# Patient Record
Sex: MALE | Race: WHITE | Employment: FULL TIME | URBAN - METROPOLITAN AREA
[De-identification: names, ages, dates, MRNs, and addresses within clinical notes are randomized per-mention and may not be internally consistent; named-entity substitution may affect disease eponyms.]

---

## 2021-02-15 ENCOUNTER — HOSPITAL ENCOUNTER (EMERGENCY)
Age: 26
Discharge: HOME | End: 2021-02-15
Attending: EMERGENCY MEDICINE
Payer: MEDICAID

## 2021-02-15 ENCOUNTER — APPOINTMENT (OUTPATIENT)
Dept: GENERAL RADIOLOGY | Age: 26
End: 2021-02-15
Attending: EMERGENCY MEDICINE
Payer: MEDICAID

## 2021-02-15 VITALS
HEART RATE: 75 BPM | BODY MASS INDEX: 25.05 KG/M2 | DIASTOLIC BLOOD PRESSURE: 75 MMHG | SYSTOLIC BLOOD PRESSURE: 122 MMHG | TEMPERATURE: 98.4 F | WEIGHT: 175 LBS | HEIGHT: 70 IN | RESPIRATION RATE: 18 BRPM | OXYGEN SATURATION: 97 %

## 2021-02-15 DIAGNOSIS — S69.92XA LEFT WRIST INJURY, INITIAL ENCOUNTER: Primary | ICD-10-CM

## 2021-02-15 PROCEDURE — 74011250637 HC RX REV CODE- 250/637: Performed by: NURSE PRACTITIONER

## 2021-02-15 PROCEDURE — 73110 X-RAY EXAM OF WRIST: CPT

## 2021-02-15 PROCEDURE — 99284 EMERGENCY DEPT VISIT MOD MDM: CPT

## 2021-02-15 RX ORDER — ACETAMINOPHEN AND CODEINE PHOSPHATE 300; 30 MG/1; MG/1
1 TABLET ORAL
Qty: 30 TAB | Refills: 0 | Status: SHIPPED | OUTPATIENT
Start: 2021-02-15 | End: 2021-02-20

## 2021-02-15 RX ORDER — HYDROCODONE BITARTRATE AND ACETAMINOPHEN 5; 325 MG/1; MG/1
1 TABLET ORAL ONCE
Status: COMPLETED | OUTPATIENT
Start: 2021-02-15 | End: 2021-02-15

## 2021-02-15 RX ADMIN — HYDROCODONE BITARTRATE AND ACETAMINOPHEN 1 TABLET: 5; 325 TABLET ORAL at 20:25

## 2021-02-15 NOTE — Clinical Note
[address] [address] EMERGENCY DEPT 
[address] [facility] [address] [phone] Work/School Note Date: 2/15/2021 To Whom It May concern: 
 
Troy Camarillo was seen and treated today in the emergency room by the following provider(s): 
Attending Provider: Charity Carmona MD 
Nurse Practitioner: Ethan Huffman NP. Troy Camarillo is excused from work/school on 02/15/21 and 02/16/21. He is medically clear to return to work/school on 2/17/2021. Sincerely, Mone Taylor NP

## 2021-02-16 NOTE — DISCHARGE INSTRUCTIONS
Home with family   use splint until you see ortho. Contact ortho tomorrow for appt with Dr Baldomero cruz.   Pain meds if needed
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
## 2021-02-16 NOTE — ED NOTES
I have reviewed discharge instructions with the patient. The patient verbalized understanding. Patient left ED via Discharge Method: ambulatory to Home with mother. Opportunity for questions and clarification provided. Patient given 1 scripts. To continue your aftercare when you leave the hospital, you may receive an automated call from our care team to check in on how you are doing. This is a free service and part of our promise to provide the best care and service to meet your aftercare needs.  If you have questions, or wish to unsubscribe from this service please call [phone]. Thank you for Choosing our [facility] Emergency Department.

## 2021-09-12 ENCOUNTER — HOSPITAL ENCOUNTER (EMERGENCY)
Age: 26
Discharge: HOME | End: 2021-09-12
Attending: EMERGENCY MEDICINE
Payer: MEDICAID

## 2021-09-12 VITALS
OXYGEN SATURATION: 97 % | WEIGHT: 180 LBS | HEART RATE: 72 BPM | DIASTOLIC BLOOD PRESSURE: 72 MMHG | SYSTOLIC BLOOD PRESSURE: 113 MMHG | BODY MASS INDEX: 25.77 KG/M2 | RESPIRATION RATE: 16 BRPM | TEMPERATURE: 98.1 F | HEIGHT: 70 IN

## 2021-09-12 DIAGNOSIS — Z20.822 CLOSE EXPOSURE TO COVID-19 VIRUS: Primary | ICD-10-CM

## 2021-09-12 LAB — SARS-COV-2, COV2: NORMAL

## 2021-09-12 PROCEDURE — U0004 COV-19 TEST NON-CDC HGH THRU: HCPCS

## 2021-09-12 PROCEDURE — 99282 EMERGENCY DEPT VISIT SF MDM: CPT

## 2021-09-12 NOTE — ED NOTES
I have reviewed discharge instructions with the patient. The patient verbalized understanding. Patient left ED via Discharge Method: ambulatory to Home. Opportunity for questions and clarification provided. Patient given 0 scripts. To continue your aftercare when you leave the hospital, you may receive an automated call from our care team to check in on how you are doing. This is a free service and part of our promise to provide the best care and service to meet your aftercare needs.  If you have questions, or wish to unsubscribe from this service please call [phone]. Thank you for Choosing our [facility] Emergency Department.

## 2021-09-12 NOTE — LETTER
[address] [address] EMERGENCY DEPT  [address] [address] [phone]    Work/School Note    Date: 9/12/2021    To Whom It May concern:    Troy Camarillo was seen and treated today in the emergency room by the following provider(s):  Attending Provider: Lauri Lewis MD  Physician Assistant: CITLALLI Levin. Troy Camarillo is excused from work/school on 9/12/2021 through 9/15/2021. He is medically clear to return to work/school on 9/16/2021.         Sincerely,          CITLALLI Villa

## 2021-09-13 LAB — SARS COV-2, XPGCVT: NEGATIVE

## 2021-10-03 ENCOUNTER — APPOINTMENT (OUTPATIENT)
Dept: GENERAL RADIOLOGY | Age: 26
End: 2021-10-03
Attending: EMERGENCY MEDICINE
Payer: MEDICAID

## 2021-10-03 ENCOUNTER — HOSPITAL ENCOUNTER (EMERGENCY)
Age: 26
Discharge: HOME | End: 2021-10-03
Attending: EMERGENCY MEDICINE
Payer: MEDICAID

## 2021-10-03 VITALS
DIASTOLIC BLOOD PRESSURE: 84 MMHG | WEIGHT: 175 LBS | BODY MASS INDEX: 25.05 KG/M2 | RESPIRATION RATE: 18 BRPM | OXYGEN SATURATION: 97 % | TEMPERATURE: 98.7 F | SYSTOLIC BLOOD PRESSURE: 150 MMHG | HEART RATE: 90 BPM | HEIGHT: 70 IN

## 2021-10-03 DIAGNOSIS — S39.012A STRAIN OF LUMBAR REGION, INITIAL ENCOUNTER: Primary | ICD-10-CM

## 2021-10-03 PROCEDURE — 81003 URINALYSIS AUTO W/O SCOPE: CPT

## 2021-10-03 PROCEDURE — 74011250636 HC RX REV CODE- 250/636: Performed by: EMERGENCY MEDICINE

## 2021-10-03 PROCEDURE — 99283 EMERGENCY DEPT VISIT LOW MDM: CPT

## 2021-10-03 PROCEDURE — 72100 X-RAY EXAM L-S SPINE 2/3 VWS: CPT

## 2021-10-03 PROCEDURE — 96372 THER/PROPH/DIAG INJ SC/IM: CPT

## 2021-10-03 PROCEDURE — 74011250637 HC RX REV CODE- 250/637: Performed by: EMERGENCY MEDICINE

## 2021-10-03 RX ORDER — KETOROLAC TROMETHAMINE 30 MG/ML
60 INJECTION, SOLUTION INTRAMUSCULAR; INTRAVENOUS
Status: COMPLETED | OUTPATIENT
Start: 2021-10-03 | End: 2021-10-03

## 2021-10-03 RX ORDER — CYCLOBENZAPRINE HCL 10 MG
10 TABLET ORAL
Qty: 13 TABLET | Refills: 0 | Status: SHIPPED | OUTPATIENT
Start: 2021-10-03

## 2021-10-03 RX ORDER — DIAZEPAM 5 MG/1
5 TABLET ORAL
Status: COMPLETED | OUTPATIENT
Start: 2021-10-03 | End: 2021-10-03

## 2021-10-03 RX ORDER — NAPROXEN SODIUM 550 MG/1
550 TABLET ORAL
Qty: 20 TABLET | Refills: 0 | Status: SHIPPED | OUTPATIENT
Start: 2021-10-03

## 2021-10-03 RX ADMIN — DIAZEPAM 5 MG: 5 TABLET ORAL at 20:06

## 2021-10-03 RX ADMIN — KETOROLAC TROMETHAMINE 60 MG: 30 INJECTION, SOLUTION INTRAMUSCULAR at 20:06

## 2021-10-03 NOTE — ED TRIAGE NOTES
Patient reports lower back pain after working today. Patient reports he was lifting and bending today at his job.  Masked

## 2021-10-04 NOTE — DISCHARGE INSTRUCTIONS
Take medications as prescribed  Expect to be stiff and sore for the next couple days  Follow-up with your rehabilitative services  Return to the ER for any new, worsening or life-threatening symptoms

## 2021-10-04 NOTE — ED PROVIDER NOTES
Patient presents the ER with complaints of back pain. States she was at work doing some lifting and twisting and felt some pain which he describes as sharp stabbing and pulling sensation in his mid to lower back. Denies any direct trauma. Has been ambulatory. Denies any numbness and tingling or weakness of lower extremities. Denies any incontinence of bowel or bladder. The history is provided by the patient. Back Pain   This is a new problem. The current episode started more than 2 days ago. The problem has been gradually worsening. The pain is present in the lumbar spine. The quality of the pain is described as aching. The pain is at a severity of 3/10. The pain is mild. Pertinent negatives include no chest pain, no fever, no numbness, no weight loss, no abdominal pain, no abdominal swelling, no bladder incontinence, no paresthesias, no paresis, no tingling and no weakness. He has tried nothing for the symptoms. Past Medical History:   Diagnosis Date    Seizures ([facility] [address]     approx 2 years ago was last seizure       History reviewed. No pertinent surgical history. History reviewed. No pertinent family history.     Social History     Socioeconomic History    Marital status: SINGLE     Spouse name: Not on file    Number of children: Not on file    Years of education: Not on file    Highest education level: Not on file   Occupational History    Not on file   Tobacco Use    Smoking status: Current Every Day Smoker    Smokeless tobacco: Never Used   Vaping Use    Vaping Use: Never used   Substance and Sexual Activity    Alcohol use: Not Currently    Drug use: Never    Sexual activity: Not on file   Other Topics Concern    Not on file   Social History Narrative    Not on file     Social Determinants of Health     Financial Resource Strain:     Difficulty of Paying Living Expenses:    Food Insecurity:     Worried About Running Out of Food in the Last Year:     [address] in the Last Year:    Transportation Needs:     Lack of Transportation (Medical):  Lack of Transportation (Non-Medical):    Physical Activity:     Days of Exercise per Week:     Minutes of Exercise per Session:    Stress:     Feeling of Stress :    Social Connections:     Frequency of Communication with Friends and Family:     Frequency of Social Gatherings with Friends and Family:     Attends Yarsani Services:     Active Member of Clubs or Organizations:     Attends Club or Organization Meetings:     Marital Status:    Intimate Partner Violence:     Fear of Current or Ex-Partner:     Emotionally Abused:     Physically Abused:     Sexually Abused: ALLERGIES: Patient has no known allergies. Review of Systems   Constitutional: Negative for fatigue, fever, unexpected weight change and weight loss. HENT: Negative for congestion and voice change. Eyes: Negative for photophobia, redness and visual disturbance. Respiratory: Negative for choking, chest tightness, shortness of breath and stridor. Cardiovascular: Negative for chest pain. Gastrointestinal: Negative for abdominal pain, rectal pain and vomiting. Endocrine: Negative for polyphagia and polyuria. Genitourinary: Negative for bladder incontinence, genital sores, hematuria, scrotal swelling, testicular pain and urgency. Musculoskeletal: Positive for back pain. Negative for myalgias and neck pain. Skin: Negative for color change and pallor. Neurological: Negative for tingling, tremors, syncope, weakness, numbness and paresthesias. Hematological: Negative for adenopathy. Does not bruise/bleed easily. Psychiatric/Behavioral: Negative for behavioral problems and confusion. All other systems reviewed and are negative.       Vitals:    10/03/21 1922   BP: (!) 150/84   Pulse: 90   Resp: 18   Temp: 98.7 °F (37.1 °C)   SpO2: 97%   Weight: 79.4 kg (175 lb)   Height: 5' 10\" (1.778 m)            Physical Exam  Vitals and nursing note reviewed. Constitutional:       General: He is not in acute distress. Appearance: Normal appearance. He is not ill-appearing. HENT:      Head: Normocephalic and atraumatic. Right Ear: External ear normal.      Left Ear: External ear normal.      Nose: Nose normal. No congestion or rhinorrhea. Eyes:      Extraocular Movements: Extraocular movements intact. Pupils: Pupils are equal, round, and reactive to light. Cardiovascular:      Rate and Rhythm: Normal rate and regular rhythm. Pulses: Normal pulses. Heart sounds: Normal heart sounds. Pulmonary:      Effort: Pulmonary effort is normal. No respiratory distress. Breath sounds: Normal breath sounds. No stridor. Abdominal:      General: Abdomen is flat. There is no distension. Palpations: Abdomen is soft. There is no mass. Musculoskeletal:         General: No swelling, deformity or signs of injury. Cervical back: Normal range of motion and neck supple. No rigidity or tenderness. Lumbar back: Tenderness present. No bony tenderness. Back:    Skin:     Coloration: Skin is not jaundiced or pale. Findings: No bruising or erythema. Neurological:      General: No focal deficit present. Mental Status: He is alert and oriented to person, place, and time. Cranial Nerves: No cranial nerve deficit. Sensory: No sensory deficit. MDM  Number of Diagnoses or Management Options  Diagnosis management comments: Patient for emergent pathology. Will obtain x-ray lumbar spine. Treat symptomatically    8:30 PM  Normal lumbar spine x-ray. Amount and/or Complexity of Data Reviewed  Tests in the radiology section of CPT®: ordered and reviewed    Risk of Complications, Morbidity, and/or Mortality  Presenting problems: low  Diagnostic procedures: low  Management options: low           Procedures                   Voice dictation software was used during the making of this note.   This software is not perfect and grammatical and other typographical errors may be present. This note has been proofread, but may still contain errors. Maryan Koyanagi, MD; 10/3/2021 @8:30 PM   ===================================================================    I wore appropriate PPE throughout this patient's ED visit.  Maryan Koyanagi, MD, 8:30 PM

## 2021-10-04 NOTE — ED NOTES
I have reviewed discharge instructions with the patient. The patient verbalized understanding. Patient left ED via Discharge Method: ambulatory to Home with family/friend. Opportunity for questions and clarification provided. Patient given 2 scripts. To continue your aftercare when you leave the hospital, you may receive an automated call from our care team to check in on how you are doing. This is a free service and part of our promise to provide the best care and service to meet your aftercare needs.  If you have questions, or wish to unsubscribe from this service please call [phone]. Thank you for Choosing our [facility] Emergency Department.

## 2021-11-07 ENCOUNTER — HOSPITAL ENCOUNTER (EMERGENCY)
Age: 26
Discharge: HOME | End: 2021-11-08
Attending: EMERGENCY MEDICINE
Payer: MEDICAID

## 2021-11-07 ENCOUNTER — APPOINTMENT (OUTPATIENT)
Dept: GENERAL RADIOLOGY | Age: 26
End: 2021-11-07
Attending: EMERGENCY MEDICINE
Payer: MEDICAID

## 2021-11-07 DIAGNOSIS — S46.912A STRAIN OF LEFT SHOULDER, INITIAL ENCOUNTER: Primary | ICD-10-CM

## 2021-11-07 PROCEDURE — 99283 EMERGENCY DEPT VISIT LOW MDM: CPT

## 2021-11-07 PROCEDURE — 74011250637 HC RX REV CODE- 250/637: Performed by: EMERGENCY MEDICINE

## 2021-11-07 RX ORDER — IBUPROFEN 800 MG/1
800 TABLET ORAL
Status: COMPLETED | OUTPATIENT
Start: 2021-11-07 | End: 2021-11-07

## 2021-11-07 RX ADMIN — IBUPROFEN 800 MG: 800 TABLET, FILM COATED ORAL at 23:14

## 2021-11-08 ENCOUNTER — APPOINTMENT (OUTPATIENT)
Dept: GENERAL RADIOLOGY | Age: 26
End: 2021-11-08
Attending: EMERGENCY MEDICINE
Payer: MEDICAID

## 2021-11-08 VITALS
BODY MASS INDEX: 26.48 KG/M2 | DIASTOLIC BLOOD PRESSURE: 75 MMHG | HEIGHT: 70 IN | OXYGEN SATURATION: 99 % | TEMPERATURE: 98.2 F | SYSTOLIC BLOOD PRESSURE: 125 MMHG | WEIGHT: 185 LBS | RESPIRATION RATE: 18 BRPM | HEART RATE: 72 BPM

## 2021-11-08 PROCEDURE — 73030 X-RAY EXAM OF SHOULDER: CPT

## 2021-11-08 RX ORDER — DICLOFENAC SODIUM 50 MG/1
50 TABLET, DELAYED RELEASE ORAL 2 TIMES DAILY
Qty: 20 TABLET | Refills: 0 | Status: SHIPPED | OUTPATIENT
Start: 2021-11-08

## 2021-11-08 NOTE — ED NOTES
I have reviewed discharge instructions with the patient. The patient verbalized understanding. Patient left ED via Discharge Method: ambulatory to Home with friends. Opportunity for questions and clarification provided. Patient given 1 scripts. To continue your aftercare when you leave the hospital, you may receive an automated call from our care team to check in on how you are doing. This is a free service and part of our promise to provide the best care and service to meet your aftercare needs.  If you have questions, or wish to unsubscribe from this service please call [phone]. Thank you for Choosing our [facility] Emergency Department.
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
## 2022-06-08 ENCOUNTER — HOSPITAL ENCOUNTER (EMERGENCY)
Dept: GENERAL RADIOLOGY | Age: 27
Discharge: HOME | End: 2022-06-11
Payer: COMMERCIAL

## 2022-06-08 PROCEDURE — 99283 EMERGENCY DEPT VISIT LOW MDM: CPT

## 2022-06-08 PROCEDURE — 73110 X-RAY EXAM OF WRIST: CPT

## 2022-06-08 ASSESSMENT — PAIN - FUNCTIONAL ASSESSMENT: PAIN_FUNCTIONAL_ASSESSMENT: 0-10

## 2022-06-08 ASSESSMENT — PAIN SCALES - GENERAL: PAINLEVEL_OUTOF10: 10

## 2022-06-09 ENCOUNTER — HOSPITAL ENCOUNTER (EMERGENCY)
Age: 27
Discharge: HOME | End: 2022-06-09
Attending: EMERGENCY MEDICINE | Admitting: EMERGENCY MEDICINE
Payer: COMMERCIAL

## 2022-06-09 VITALS
SYSTOLIC BLOOD PRESSURE: 125 MMHG | BODY MASS INDEX: 27.2 KG/M2 | RESPIRATION RATE: 15 BRPM | TEMPERATURE: 98.6 F | DIASTOLIC BLOOD PRESSURE: 75 MMHG | HEIGHT: 70 IN | OXYGEN SATURATION: 99 % | HEART RATE: 80 BPM | WEIGHT: 190 LBS

## 2022-06-09 DIAGNOSIS — S63.502A SPRAIN OF LEFT WRIST, INITIAL ENCOUNTER: Primary | ICD-10-CM

## 2022-06-09 PROCEDURE — 6370000000 HC RX 637 (ALT 250 FOR IP): Performed by: PHYSICIAN ASSISTANT

## 2022-06-09 RX ORDER — MELOXICAM 7.5 MG/1
7.5 TABLET ORAL DAILY
Qty: 14 TABLET | Refills: 0 | Status: SHIPPED | OUTPATIENT
Start: 2022-06-09 | End: 2022-06-23

## 2022-06-09 RX ORDER — IBUPROFEN 600 MG/1
600 TABLET ORAL
Status: COMPLETED | OUTPATIENT
Start: 2022-06-09 | End: 2022-06-09

## 2022-06-09 RX ADMIN — IBUPROFEN 600 MG: 600 TABLET ORAL at 00:57

## 2022-06-09 ASSESSMENT — ENCOUNTER SYMPTOMS
GASTROINTESTINAL NEGATIVE: 1
RESPIRATORY NEGATIVE: 1

## 2022-06-09 NOTE — ED PROVIDER NOTES
[facility] Provider Note                     PCP:                None Provider               Age: 33 y.o. Sex: male         No diagnosis found. [unfilled]     [facility]  Number of Diagnoses or Management Options  Diagnosis management comments: Patient is 23-year-old male who presents with left wrist pain. Says been bothering him for a while but tonight felt a pop while he was playing with his daughters. X-ray without acute abnormality. Nontender in anatomical snuffbox. Tender more over the ulnar aspect. Will place in wrist splint and have him follow-up with Ortho. He is to ice and use anti-inflammatories. Return precautions given. Risk of Complications, Morbidity, and/or Mortality  Presenting problems: low  Diagnostic procedures: low  Management options: low    Patient Progress  Patient progress: stable      Orders Placed This Encounter   Procedures    XR WRIST LEFT (MIN 3 VIEWS)    Wrist Splint, Velcro        Spurgon Still is a 33 y.o. male who presents to the Emergency Department with chief complaint of    Chief Complaint   Patient presents with    Wrist Pain      Patient is a 23-year-old male who presents with left wrist pain. He says has been hurting him for quite a while but tonight while he was playing with his daughters he felt a pop. Hurts to move. No fevers other injuries or complaints. He is right-hand dominant. Review of Systems   Constitutional: Negative. HENT: Negative. Respiratory: Negative. Cardiovascular: Negative. Gastrointestinal: Negative. Genitourinary: Negative. Musculoskeletal: Positive for arthralgias. All other systems reviewed and are negative. All other systems reviewed and are negative.       @[email or web address]@     @[facility]@    @[facility]@        Social Connections:     Frequency of Communication with Friends and Family: Not on file    Frequency of Social Gatherings with Friends and Family: Not on file   Mara Attends Temple Services: Not on file    Active Member of Clubs or Organizations: Not on file    Attends Club or Organization Meetings: Not on file    Marital Status: Not on file        No Known Allergies     Vitals signs and nursing note reviewed. Patient Vitals for the past 4 hrs:   Temp Resp BP   06/08/22 2321 99 °F (37.2 °C) 18 126/78          Physical Exam  Vitals and nursing note reviewed. Constitutional:       General: He is not in acute distress. Appearance: Normal appearance. He is normal weight. He is not ill-appearing or toxic-appearing. HENT:      Head: Normocephalic. Eyes:      Extraocular Movements: Extraocular movements intact. Cardiovascular:      Rate and Rhythm: Normal rate and regular rhythm. Pulses: Normal pulses. Heart sounds: Normal heart sounds. Pulmonary:      Effort: Pulmonary effort is normal.      Breath sounds: Normal breath sounds. Musculoskeletal:         General: Tenderness present. No swelling. Cervical back: Normal range of motion and neck supple. Comments: Left wrist with decreased under motion secondary to pain. Tenderness over the ulnar aspect of the wrist.  Can flex and extend, but painful. Strong radial pulse. Good capillary refill and sensation intact. Can make a fist.   Skin:     General: Skin is warm and dry. Findings: No rash. Neurological:      General: No focal deficit present. Mental Status: He is alert and oriented to person, place, and time. Mental status is at baseline. Psychiatric:         Mood and Affect: Mood normal.         Behavior: Behavior normal.         Thought Content: Thought content normal.              Procedures    [unfilled]     XR WRIST LEFT (MIN 3 VIEWS)   Final Result   Negative study. Voice dictation software was used during the making of this note. This software is not perfect and grammatical and other typographical errors may be present.   This note has not been completely proofread for errors.         [place], [place]  06/09/22 6883

## 2022-06-14 ENCOUNTER — OFFICE VISIT (OUTPATIENT)
Dept: ORTHOPEDIC SURGERY | Age: 27
End: 2022-06-14
Payer: COMMERCIAL

## 2022-06-14 VITALS — HEIGHT: 70 IN | BODY MASS INDEX: 27.2 KG/M2 | WEIGHT: 190 LBS

## 2022-06-14 DIAGNOSIS — M25.539 PAIN OF ULNAR SIDE OF WRIST: Primary | ICD-10-CM

## 2022-06-14 PROCEDURE — 99204 OFFICE O/P NEW MOD 45 MIN: CPT | Performed by: ORTHOPAEDIC SURGERY

## 2022-06-14 NOTE — PROGRESS NOTES
Orthopaedic Hand Clinic Note    Name: Saleem Monroe  YOB: 1995  Gender: male  MRN: [id number]      CC: Patient referred for evaluation of upper extremity pain    HPI: Samson Monroe is a 33 y.o. male with a chief complaint of left wrist pain. The patient has had wrist issues from an injury at work about a year ago, but more recently he injured his wrist picking up his child on 6/9/2022. He did go to the emergency department, as he felt something snap in his wrist.  He has pain in the wrist which is located on the ulnar aspect of the wrist.  He has been using a wrist brace. .      ROS/Meds/PSH/PMH/FH/SH: I personally reviewed the patients standard intake form. Pertinents are discussed in the HPI    Physical Examination:    Musculoskeletal Exam:  Examination on the left upper extremity demonstrates cap refill < 5 seconds in all fingers, is intact, there is no soft tissue swelling or ecchymosis. He is guarding significantly on exam so thorough evaluation is difficult. He is tender to palpation over the ulnar aspect aspect of the wrist fairly diffusely. There is no apparent DRUJ instability, there is some palpable crepitus with supination and pronation of the wrist but again difficult to elucidate the origin of this crepitus as the patient guards significantly during exam.  He has no tenderness to palpation over the distal radius, anatomic snuffbox or dorsal SL interval.  Hernández test is negative. .    Imaging / Electrodiagnostic Tests: Independently reviewed and interpreted radiographs of the left wrist from the emergency department. There are no fractures or dislocations. Carpal alignment is normal.  Ulnar variance is neutral.      Assessment:     ICD-10-CM    1. Pain of ulnar side of wrist  M25.539 MRI WRIST LEFT WO CONTRAST       Plan:   We discussed the diagnosis and different treatment options.  We discussed observation, therapy, antiinflammatory medications and other pertinent treatment modalities. After discussing in detail the patient elects to proceed with nonsteroidal anti-inflammatory medications as needed for pain. He can continue to use his wrist brace. We will obtain MRI of the left wrist to further evaluate for possible TFCC injury or ECU sub sheath tear/subluxation. He will return once this is complete and we will discuss the results and further treatment options. Patient voiced accordance and understanding of the information provided and the formulated plan. All questions were answered to the patient's satisfaction during the encounter.       Emilia Mancera MD  Orthopaedic Surgery  06/14/22  9:57 AM

## 2022-06-16 ENCOUNTER — TELEPHONE (OUTPATIENT)
Dept: ORTHOPEDIC SURGERY | Age: 27
End: 2022-06-16

## 2022-06-28 ENCOUNTER — OFFICE VISIT (OUTPATIENT)
Dept: ORTHOPEDIC SURGERY | Age: 27
End: 2022-06-28
Payer: COMMERCIAL

## 2022-06-28 DIAGNOSIS — M25.539 PAIN OF ULNAR SIDE OF WRIST: Primary | ICD-10-CM

## 2022-06-28 PROCEDURE — 20605 DRAIN/INJ JOINT/BURSA W/O US: CPT | Performed by: ORTHOPAEDIC SURGERY

## 2022-06-28 PROCEDURE — 99214 OFFICE O/P EST MOD 30 MIN: CPT | Performed by: ORTHOPAEDIC SURGERY

## 2022-06-28 RX ORDER — BETAMETHASONE SODIUM PHOSPHATE AND BETAMETHASONE ACETATE 3; 3 MG/ML; MG/ML
6 INJECTION, SUSPENSION INTRA-ARTICULAR; INTRALESIONAL; INTRAMUSCULAR; SOFT TISSUE ONCE
Status: COMPLETED | OUTPATIENT
Start: 2022-06-28 | End: 2022-06-28

## 2022-06-28 RX ADMIN — BETAMETHASONE SODIUM PHOSPHATE AND BETAMETHASONE ACETATE 6 MG: 3; 3 INJECTION, SUSPENSION INTRA-ARTICULAR; INTRALESIONAL; INTRAMUSCULAR; SOFT TISSUE at 09:39

## 2022-06-28 NOTE — PROGRESS NOTES
Orthopaedic Hand Clinic Note    Name: Herberth Monroe  YOB: 1995  Gender: male  MRN: [id number]      Follow up visit:   1. Pain of ulnar side of wrist        HPI: Isabel Bañuelos is a 33 y.o. male who is following up for his left wrist pain. He states that the wrist still pops with certain movements. He is not sure what exactly makes it pop. He is here to review his MRI.      ROS/Meds/PSH/PMH/FH/SH: I personally reviewed the patients standard intake form. Pertinents are discussed in the HPI    Physical Examination:    Musculoskeletal Examination:  Examination on the left upper extremity demonstrates cap refill < 5 seconds in all fingers, is intact, there is no soft tissue swelling or ecchymosis. He is tender to palpation over the ulnar aspect aspect of the wrist fairly diffusely. There is no crepitus on exam today, and he is unable to reproduce these symptoms. There is no ECU subluxation. There is no DRUJ instability. He has no tenderness to palpation over the distal radius, anatomic snuffbox or dorsal SL interval.  Hernández test is negative. .    Imaging / Electrodiagnostic Tests:     I independently reviewed and interpreted the patient's wrist MRI. There is some increased signal within the styloid and foveal attachments of the TFCC, but the articular disc appears to be intact. Styloid and foveal attachments to do also appear to be intact on MRI. ECU is located within the ulnar groove. There is no apparent ECU subsheath tearing or surrounding edema. Scapholunate and lunotriquetral ligaments are intact    Assessment:     ICD-10-CM    1. Pain of ulnar side of wrist  M25.539 betamethasone acetate-betamethasone sodium phosphate (CELESTONE) injection 6 mg     DRAIN/INJECT INTERMEDIATE JOINT/BURSA     Ambulatory referral to Occupational Therapy       Plan:   We discussed the diagnosis and different treatment options.  We discussed observation, therapy, antiinflammatory medications and other pertinent

## 2022-07-05 ENCOUNTER — OFFICE VISIT (OUTPATIENT)
Dept: ORTHOPEDIC SURGERY | Age: 27
End: 2022-07-05
Payer: COMMERCIAL

## 2022-07-05 DIAGNOSIS — M25.632 STIFFNESS OF LEFT WRIST JOINT: ICD-10-CM

## 2022-07-05 DIAGNOSIS — R29.898 DECREASED GRIP STRENGTH: ICD-10-CM

## 2022-07-05 DIAGNOSIS — R29.898 UPPER EXTREMITY WEAKNESS: ICD-10-CM

## 2022-07-05 DIAGNOSIS — M25.532 LEFT WRIST PAIN: Primary | ICD-10-CM

## 2022-07-05 PROCEDURE — 97010 HOT OR COLD PACKS THERAPY: CPT | Performed by: OCCUPATIONAL THERAPIST

## 2022-07-05 PROCEDURE — 97165 OT EVAL LOW COMPLEX 30 MIN: CPT | Performed by: OCCUPATIONAL THERAPIST

## 2022-07-05 PROCEDURE — 97110 THERAPEUTIC EXERCISES: CPT | Performed by: OCCUPATIONAL THERAPIST

## 2022-07-05 NOTE — PROGRESS NOTES
[facility]  [address] [place] [id number]  Dept: [phone]      Occupational Therapy Initial Assessment     Referring MD: Catina Delgado MD    Diagnosis:     ICD-10-CM    1. Left wrist pain  M25.532    2. Decreased  strength  R29.898    3. Upper extremity weakness  R29.898    4. Stiffness of left wrist joint  M25.632         Surgery/Medical Dx:   DOI  6/9/22    Therapy precautions: None    Total Direct Treatment Time: 20 min  Total In Office Time: 55 min    Preferred Name: Kiki Nowata HISTORY     PMHX & Meds:   Past Medical History:   Diagnosis Date    Seizures ([address]     approx 2 years ago was last seizure   , History reviewed. No pertinent surgical history. Medications. : Reviewed in chart  Allergies: No Known Allergies     SUBJECTIVE     Current Symptoms/Chief complaints:   Chief Complaint   Patient presents with    Wrist Pain       History of injury/onset :  Pt reports he fell on his Left outstretched hand at work approx 1 year ago and developed left wrist pain and tingling. 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  Reports poorly localized and diffuse symptoms in his wrist, reporting volar dorsal and radial wrist discomfort with examination at various times. Pt reports intermittent numbness in his hand, worse at work, in both median and ulnar nerve distributions of his hand. Pt has a prefab wrist orthosis he reports he has been using with some decrease in symptoms. Chief complaint/history of injury:    Date symptoms began: 6/9/22  Jihan People of condition:  Recent, within last 3 months   Primary cause of current episode: Traumatic   How did symptoms start: 6/9/22    Describe current symptoms: Reports poorly localized and diffuse symptoms/pain in his wrist, reporting volar dorsal and radial and ulnar wrist pain intermittently during examination at various times.   Pt reports intermittent numbness in his hand, worse at work, in both median and ulnar nerve distributions of his hand.  Received previous outpatient therapy? No       Pain Assessment:   Pain location:  Left wrist   Average Pain/symptom intensity (0-10 scale)  o Last 24 hours: 7/10  o Last week (1-7 days): _7/10   How often do you feel symptoms? Intermittently (0-25%)   Description: aching and tingling   Aggravating factors: gripping broom at work  Kathleen Miguel Alleviating factors: wearing orthosis/ rest    Social/Functional Hx:  Pt lives lives with their family   Current DME: brace/splinthas prefab wrist lacer  Work Status: Employed full time: works in dietary   Sleep: minimally disturbed  PLOF & Social Hx/Interests:  Independent and active without physical limitations  Current level of function: reports pain and numbness interfering with gripping/pushing/pulling/resistive use of left hand    Neuro screen: Pt c/o, tingling and and intermittent numbness in IF/MF/RF/SF (median and ulnar nerve distributions of hand), able to perform ulnar nerve flossing in less than mid ranges due to c/o neural tension    Patient Stated Goals: \"to get the full use of my left hand back, it hasn't been right since I fell at work last year\"    OBJECTIVE     Functional Outcome Measures: Quick Dash  36 score=   55 % functional deficit  Hand/Side Dominance: right handed  Observation/Posture: holds fingers in protective flexon/guarded wrist motion  Palpation:  Poorly localized, report wrist pain ulnar, volar, dorsoradially at various times  Swelling/Edema: minimal Wrist circumferential  R  17.5    L  18 cm  Skin Integrity: normal     A/PROM Measures:    Shoulder and elbow screen:  Full AROM compared to Right    Pt is hesitant to but can fully extend fingers  ADPC IF   1 cm   MF  0 cm   RF  0 cm   SF  0 cm    Wrist A/PROM: RIGHT LEFT PROM  involved side    Wrist Extension/Flexion 63/62°  55/50°  °     RD/UD 25/33°  20/20°          /Pinch Strength  Strength (psi): RIGHT LEFT LEFT     Position II 85 17  Left  pronated 22, no ulnar wrist pain    Lat Pinch: 18  4     2pt pinch: 14 6.5     3pt pinch: 17 5            STRENGTH (PSI)  R  L  POS I     83 lbs  25  POS II     85  17   POS III    80  16  POS IV    83   15  POS V     75  13       Special Tests:  DRUJ Ballotment Test: Negative, Deep Volar and Deep Dorsal radioulnar ligament shift testing = negative for increased laxity or pain,   testing wrist pronated = negative for increased pain in ulnar wrist and achieved 4 lbs more,   Evaluation Modifications/Assistance required : Verbal cues, Physical cues and Tactile cues  Degree of assistance provided: minimal     Treatment:  Initial Evaluation: Low Complexity (89222)     Moist heat x 8 minutes to decrease pain/increase extensibility     Therapeutic exercise (31300) x 15 min:  ? Home Exercise Program development and Education: see below. Patient I with program following instruction and performance  ? Use of heat and ice as needed for pain and inflammation reduction. ? OT POC and rationale, education for purpose of nerve flossing, dart throwers exercise, pain management and  edema management with use of orthosis  ? STM with lotion following heat  ? Ulnar nerve flossing, less than mid ranges due to complaints of neural tension  ? Dart throwers exercise, 2 sets 10 reps  ? Gentle arom as below:        Access Code: Alex Mendoza: [email or web address]  Date: 07/05/2022  Prepared by: Gopal Carrillo    Exercises  Wrist Extension AROM - 3 x daily - 7 x weekly - 1 sets - 10 reps - 5 hold  Seated Wrist Flexion AROM - 3 x daily - 7 x weekly - 1 sets - 10 reps - 5 hold  Wrist Flexion Extension AROM - Palms Down - 3 x daily - 7 x weekly - 1 sets - 10 reps - 5 hold  Seated Wrist Radial and Ulnar Deviation AROM - 3 x daily - 7 x weekly - 1 sets - 10 reps - 5 hold  Ulnar Nerve Flossing - 3 x daily - 7 x weekly - 1-2 sets - 10 reps      CLINICAL DECISION MAKING/ASSESSMENT     Performance Deficits  Physical: Dexterity, Mobility, Strength and Sensation  Cognitive: No deficiencies noted  Psychosocial: No deficiencies noted  Rehab potential: fair    The patient presents today due to complaints of poorly localized/diffuse left wrist pain and intermittent numbness in median and ulnar nerve and guarding of left upper extremity due to c/o pain . The patient presents with poorly localized/diffuse left wrist pain and intermittent numbness in median and ulnar nerve and guarding of left upper extremity  . These deficits appear to be secondary to injury one year ago and recently 6/9/22 . Based on these subjective and objective findings, the patient is perceived as currently having a primary functional deficit of  55% dysfunction. After a brief chart/PMH and OT profile review, evaluation requiring minimal  assistance/modifications and simple patient and data analysis, I have determined the patient exhibits several (1-3) performance deficits. Comorbidities may affect the patient's occupational performance. The following performance deficits (including but not limited to physical, cognitive and/or psychosocial components) result in activity limitations and participation restrictions: Dexterity, Mobility, Strength and Sensation    The patient would benefit from skilled occupational therapy services to address the deficits noted above for return to prior level of function. Effective Dates: 7/5/2022 TO 9/3/2022 (60 days).     Frequency/Duration: 1 to 2 x wk for 60 Day(s)  Interventions may include but are not limited to: (25761) Therapeutic exercise to develop strength and endurance, range of motion, and flexibility, (28353) Manual Therapy:   Consisting of but not limited to hands on treatment by therapist for connective tissue massage, pain management, edema management, joint mobilization and manipulation, manual traction, passive range of motion, soft tissue and neural system mobilization/manipulation and therapeutic massage., (05443) Therapeutic activities:Direct one on one patient contact with provider, use of dynamic activities to improve functional performance, Modalities prn to address pain, spasms, and swelling: (48920) Hot/cold pack  (55900) Fluidotherapy  (44738) Paraffin, Home exercise program (HEP) development, (60947) Neuromuscular Re-education: to improve balance, coordination, kinesthetic sense, posture and proprioception (e.g., proprioceptive and neuromuscular facilitation, desensitization techniques), (52308) Kineseotaping for Neuromuscular Re-education : Muscle inhibition or facilitation, space correction, to improve proprioception,; for endurance or correction of postural stabilizers; addressing trophic changes of complex regional pain syndrome, (17985) Kineseotaping for Manual Therapy Techniques for soft tissue mobilization, facilitation of muscles, pain management, lymphatic management, swelling management, scar mobilization, myofascial correction, mechanical joint correction or support and (32182) Kineseotaping for Therapeutic Procedure/Exercise  for strengthening or lengthening a muscle. Used in conjunction with retraining posture, endurance and flexibility    The referring physician has reviewed and approved this evaluation and plan of care as noted by the electronic signature attached to note.     GOALS   Short Term Goals:  8/16/2022 (6 weeks)   Patient will be I with HEP   Decrease pain with light resistive use at home to no > 2-3/10   Increase Left Wrist arom (flex and ext) by 5 degrees each   Increase Left wrist RD/UD AROM arc of motion by 10 to 15 degrees   Pt will increase Left  strength to at least 30 lbs on POS II to increase ease with sweeping at work/resistive work and home management   Pts Quick DASH functional assessment score will be less than 40 % fxal deficit      Long Term Goals: 9/27/2022 (12 weeks)   Pt will demonstrate AROM within 5 degrees of right to increase ease with using tools/resistive home management and work tasks   Pt will increase  strength (POS II) to 50 lbs or >  in order to be I with opening tight jars/containers and using tools   Pt will be able to lift and carry items (ie grocery bags, laundry basket etc) with L UE pain 2 of 10 or less.    Pts Quick DASH functional assessment score will be less than 20% functional deficit   Pt will be I with HEP for ROM and strengthening as indicated at time of discharge    [facility] Portal     OT Protocols

## 2022-07-12 ENCOUNTER — OFFICE VISIT (OUTPATIENT)
Dept: ORTHOPEDIC SURGERY | Age: 27
End: 2022-07-12
Payer: COMMERCIAL

## 2022-07-12 DIAGNOSIS — R29.898 DECREASED GRIP STRENGTH: ICD-10-CM

## 2022-07-12 DIAGNOSIS — M25.532 LEFT WRIST PAIN: Primary | ICD-10-CM

## 2022-07-12 DIAGNOSIS — R29.898 UPPER EXTREMITY WEAKNESS: ICD-10-CM

## 2022-07-12 PROCEDURE — 97022 WHIRLPOOL THERAPY: CPT | Performed by: OCCUPATIONAL THERAPIST

## 2022-07-12 PROCEDURE — 97110 THERAPEUTIC EXERCISES: CPT | Performed by: OCCUPATIONAL THERAPIST

## 2022-07-12 PROCEDURE — 97140 MANUAL THERAPY 1/> REGIONS: CPT | Performed by: OCCUPATIONAL THERAPIST

## 2022-07-12 NOTE — PROGRESS NOTES
[address] [facility]THOPED[facility] - [address] [address]  Dept: [phone]      Occupational Therapy Initial Assessment     Referring MD: Dawn Arriaga MD    Diagnosis:     ICD-10-CM    1. Left wrist pain  M25.532    2. Decreased  strength  R29.898    3. Upper extremity weakness  R29.898         Surgery/Medical Dx:   DOI  6/9/22    Therapy precautions: None    Total Direct Treatment Time:  40 min  Total In Office Time: 55  min    Preferred Name: Kaiser Moore    History of injury/onset :  Pt reports he fell on his Left outstretched hand at work approx 1 year ago and developed left wrist pain and tingling. 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  Reports poorly localized and diffuse symptoms in his wrist, reporting volar dorsal and radial wrist discomfort with examination at various times. Pt reports intermittent numbness in his hand, worse at work, in both median and ulnar nerve distributions of his hand. Pt has a prefab wrist orthosis he reports he has been using with some decrease in symptoms. SUBJECTIVE     Pt reports ulnar wrist pain better, and hand feeing better, better on days when not working. Reports continued intermittent numbness in median and ulnar nerve distributions of hand and tension with midrange neural flossing.         OBJECTIVE     Neuro screen: Pt c/o, tingling and and intermittent numbness in IF/MF/RF/SF (median and ulnar nerve distributions of hand), able to perform ulnar nerve flossing in less than mid ranges due to c/o neural tension    AROM Measures:    Shoulder and elbow screen:  Full AROM compared to Right    Pt is hesitant to but can fully extend fingers  ADPC IF   1 cm   MF  0 cm   RF  0 cm   SF  0 cm    Wrist A/PROM: RIGHT LEFT PROM  involved side    Wrist Extension/Flexion 63/62°  55/50°  °     RD/UD 25/33°  20/20°          /Pinch Strength  Strength (psi): RIGHT LEFT LEFT     Position II 85 17  Left  pronated 22, no ulnar wrist pain    Lat Pinch: 18  4     2pt pinch: 14 6.5     3pt pinch: 17 5            STRENGTH (PSI)  R  L  POS I     83 lbs  25  POS II     85  17   POS III    80  16  POS IV    83   15  POS V     75  13       Special Tests:  DRUJ Ballotment Test: Negative, Deep Volar and Deep Dorsal radioulnar ligament shift testing = negative for increased laxity or pain,   testing wrist pronated = negative for increased pain in ulnar wrist and achieved 4 lbs more,     Treatment:    Fluidotherapy x 10 minutes to increase tissue ext prior to STM/nerve flossing and exercise  (88699)      Therapeutic exercise (35665) x 30 min:  ? Home Exercise Program development and Education: see below. Patient I with program following instruction and performance  ? Use of heat and ice as needed for pain and inflammation reduction. ? OT POC and rationale, education for purpose of nerve flossing, dart throwers exercise, pain management and  edema management with use of orthosis  ? STM with lotion following fluidotherapy prior to wrist distraction to increase lubrication to joint / to decrease pain  ? Ulnar nerve flossing, less than mid ranges due to complaints of neural tension  ? Added median nerve flossing with limited to 3/4 range due to neural tension, advised to avoid symptoms of pain/increased paresthesias  ? Dart throwers exercise, 2 sets 10 reps  ? Wrist arom WE/WF        Access Code: [email or web address]  URL: [email or web address]  Date: 07/05/2022  Prepared by: Kay Saldivar    Exercises  Wrist Extension AROM - 3 x daily - 7 x weekly - 1 sets - 10 reps - 5 hold  Seated Wrist Flexion AROM - 3 x daily - 7 x weekly - 1 sets - 10 reps - 5 hold  Wrist Flexion Extension AROM - Palms Down - 3 x daily - 7 x weekly - 1 sets - 10 reps - 5 hold  Seated Wrist Radial and Ulnar Deviation AROM - 3 x daily - 7 x weekly - 1 sets - 10 reps - 5 hold  Ulnar Nerve Flossing - 3 x daily - 7 x weekly - 1-2 sets - 10 reps      Manual Therapy (66798) x 10  minutes: Addressing soft tissue/joint restrictions and swelling of  Left hand/wrist*:    ? Retrograde edema massage  ? Scar mobilization by therapist   ? K Taping to volar/dorsal hand/wrist to decrease edema in hand/ trial to decrease paresthesias in median nerve distribution of hand    CLINICAL DECISION MAKING/ASSESSMENT     Pt reports a reduction in ulnar wrist pain, continues to report paresthesias in median and ulnar nerve distributions of hand. Added median nerve flossing and trial of K taping to decrease edema in carpal tunnel, able to tolerate only mid ranges at present due to neural tension, advised to avoid pain with exercise. Advised pt to sleep in orthosis to also decrease compression at night with sleeping positions. 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    Pts Quick DASH functional assessment score will be less than 20% functional deficit   Pt will be I with HEP for ROM and strengthening as indicated at time of discharge    [facility] Portal     OT Protocols

## 2022-11-27 ENCOUNTER — HOSPITAL ENCOUNTER (EMERGENCY)
Age: 27
Discharge: HOME | End: 2022-11-28
Attending: EMERGENCY MEDICINE | Admitting: EMERGENCY MEDICINE
Payer: COMMERCIAL

## 2022-11-27 DIAGNOSIS — U07.1 COVID-19: Primary | ICD-10-CM

## 2022-11-27 DIAGNOSIS — R52 PAINFUL COUGH: ICD-10-CM

## 2022-11-27 DIAGNOSIS — R05.8 PAINFUL COUGH: ICD-10-CM

## 2022-11-27 PROCEDURE — 87804 INFLUENZA ASSAY W/OPTIC: CPT

## 2022-11-27 PROCEDURE — 87635 SARS-COV-2 COVID-19 AMP PRB: CPT

## 2022-11-27 PROCEDURE — 99283 EMERGENCY DEPT VISIT LOW MDM: CPT

## 2022-11-27 ASSESSMENT — LIFESTYLE VARIABLES: HOW OFTEN DO YOU HAVE A DRINK CONTAINING ALCOHOL: NEVER

## 2022-11-27 ASSESSMENT — PAIN SCALES - GENERAL: PAINLEVEL_OUTOF10: 7

## 2022-11-27 ASSESSMENT — PAIN - FUNCTIONAL ASSESSMENT: PAIN_FUNCTIONAL_ASSESSMENT: 0-10

## 2022-11-27 ASSESSMENT — PAIN DESCRIPTION - DESCRIPTORS: DESCRIPTORS: ACHING

## 2022-11-27 NOTE — Clinical Note
Samson Still was seen and treated in our emergency department on 11/27/2022. COVID19 virus is suspected. Per the CDC guidelines we recommend home isolation until the following conditions are all met:    1. At least five days have passed since symptoms first appeared and/or had a close exposure,   2. After home isolation for five days, wearing a mask around others for the next five days,  3. At least 24 have passed since last fever without the use of fever-reducing medications and  4. Symptoms (eg cough, shortness of breath) have improved    If you have any questions or concerns, please don't hesitate to call.     He may return to work/school on 12/06/2022        MARK ANTHONY Lucas NP

## 2022-11-27 NOTE — Clinical Note
Samson Still was seen and treated in our emergency department on 11/27/2022. COVID19 virus is suspected. Per the CDC guidelines we recommend home isolation until the following conditions are all met:    1. At least five days have passed since symptoms first appeared and/or had a close exposure,   2. After home isolation for five days, wearing a mask around others for the next five days,  3. At least 24 have passed since last fever without the use of fever-reducing medications and  4. Symptoms (eg cough, shortness of breath) have improved    If you have any questions or concerns, please don't hesitate to call.     He may return to work/school on 12/06/2022        Jessy Flanagan, MARK ANTHONY - NP

## 2022-11-28 VITALS
BODY MASS INDEX: 27.92 KG/M2 | HEART RATE: 85 BPM | SYSTOLIC BLOOD PRESSURE: 114 MMHG | OXYGEN SATURATION: 96 % | HEIGHT: 70 IN | TEMPERATURE: 98.3 F | RESPIRATION RATE: 14 BRPM | WEIGHT: 195 LBS | DIASTOLIC BLOOD PRESSURE: 75 MMHG

## 2022-11-28 LAB
FLUAV AG NPH QL IA: NEGATIVE
FLUBV AG NPH QL IA: NEGATIVE
SARS-COV-2 RDRP RESP QL NAA+PROBE: DETECTED
SOURCE: ABNORMAL
SPECIMEN SOURCE: NORMAL

## 2022-11-28 PROCEDURE — 6370000000 HC RX 637 (ALT 250 FOR IP): Performed by: NURSE PRACTITIONER

## 2022-11-28 RX ORDER — HYDROCODONE BITARTRATE AND HOMATROPINE METHYLBROMIDE ORAL SOLUTION 5; 1.5 MG/5ML; MG/5ML
5 LIQUID ORAL
Status: COMPLETED | OUTPATIENT
Start: 2022-11-28 | End: 2022-11-28

## 2022-11-28 RX ORDER — HYDROCODONE BITARTRATE AND HOMATROPINE METHYLBROMIDE ORAL SOLUTION 5; 1.5 MG/5ML; MG/5ML
5 LIQUID ORAL 4 TIMES DAILY PRN
Qty: 60 ML | Refills: 0 | Status: SHIPPED | OUTPATIENT
Start: 2022-11-28 | End: 2022-12-03

## 2022-11-28 RX ORDER — IBUPROFEN 800 MG/1
800 TABLET ORAL
Status: COMPLETED | OUTPATIENT
Start: 2022-11-28 | End: 2022-11-28

## 2022-11-28 RX ADMIN — IBUPROFEN 800 MG: 800 TABLET ORAL at 00:55

## 2022-11-28 RX ADMIN — HYDROCODONE BITARTRATE AND HOMATROPINE METHYLBROMIDE 5 ML: 5; 1.5 SOLUTION ORAL at 00:55

## 2022-11-28 ASSESSMENT — ENCOUNTER SYMPTOMS
SORE THROAT: 1
NAUSEA: 0
DIARRHEA: 0
BACK PAIN: 0
COUGH: 1
EYES NEGATIVE: 1
VOMITING: 0
SHORTNESS OF BREATH: 0
ABDOMINAL PAIN: 0

## 2022-11-28 NOTE — ED PROVIDER NOTES
Emergency Department Provider Note                   PCP:                Md Janett Doty               Age: 33 y.o. Sex: male       ICD-10-CM    1. COVID-19  U5.3           DISPOSITION Decision To Discharge 11/28/2022 12:35:30 AM       BRIANDA Smith is a 33 y.o. male who presents to the Emergency Department with chief complaint of URI symptoms. COVID and flu swabs were ordered from triage based on physical complaints. COVID returned positive. Physical exam shows no bacterial source that would require antibiotics. Will recommend continued symptomatic management. We will give him a dose of Hycodan for his painful cough and ibuprofen. Vital signs are stable here in the ED with normal oxygenation at 96% and afebrile. Strict return precautions given. Safe for discharge at this time. Work note provided. Regino Mandel, FNP-C, ENP-C  12:37 AM            Orders Placed This Encounter   Procedures    Rapid influenza A/B antigens    COVID-19, Rapid        Medications - No data to display    New Prescriptions    No medications on file        Samson Monroe is a 33 y.o. male who presents to the Emergency Department with chief complaint of URI symptoms. Chief Complaint   Patient presents with    Fever    Cough      HPI  [place] 81-year-old male with history of seizures, presenting to the ED for evaluation of URI symptoms. Patient reports onset of symptoms 3 days ago, including painful cough, sore throat, headache and sweating. He has been using over-the-counter TheraFlu for symptom management. Denies vision changes, nausea, vomiting, diarrhea, abdominal pain, shortness of breath. Patient does vape. No known sick contacts. All other systems reviewed and are negative unless otherwise stated in the history of present illness section. Review of Systems   Constitutional:  Positive for activity change (decreased), appetite change (decreased) and diaphoresis. Negative for fever.    HENT: Positive for congestion and sore throat. Eyes: Negative. Respiratory:  Positive for cough. Negative for shortness of breath. Cardiovascular:  Positive for chest pain (from coughing). Gastrointestinal:  Negative for abdominal pain, diarrhea, nausea and vomiting. Musculoskeletal:  Negative for arthralgias and back pain. Neurological:  Positive for headaches. All other systems reviewed and are negative. Past Medical History:   Diagnosis Date    Seizures ([address]     approx 2 years ago was last seizure        No past surgical history on file. No family history on file. Social History     Socioeconomic History    Marital status: Single   Tobacco Use    Smoking status: Former    Smokeless tobacco: Never   Vaping Use    Vaping Use: Never used   Substance and Sexual Activity    Alcohol use: Not Currently    Drug use: Never        Allergies: Patient has no known allergies. Previous Medications    CYCLOBENZAPRINE (FLEXERIL) 10 MG TABLET    Take 10 mg by mouth 3 times daily as needed    MELOXICAM (MOBIC) 7.5 MG TABLET    Take 1 tablet by mouth daily for 14 days        Vitals signs and nursing note reviewed. Patient Vitals for the past 4 hrs:   Temp Pulse Resp BP SpO2   11/27/22 2344 98.3 °F (36.8 °C) 85 16 114/75 96 %          Physical Exam  Vitals and nursing note reviewed. Constitutional:       Appearance: He is ill-appearing. HENT:      Mouth/Throat:      Mouth: Mucous membranes are moist.      Pharynx: Oropharynx is clear. Cardiovascular:      Rate and Rhythm: Normal rate. Pulmonary:      Effort: Pulmonary effort is normal.      Breath sounds: Normal breath sounds. No decreased breath sounds or wheezing. Comments: Respirations even and unlabored no decreased breath sounds  Abdominal:      General: Abdomen is flat. Palpations: Abdomen is soft. Skin:     General: Skin is warm and dry.    Psychiatric:         Mood and Affect: Mood normal.        Procedures      Results for orders placed or performed during the hospital encounter of 11/27/22   Rapid influenza A/B antigens    Specimen: Nasal Washing   Result Value Ref Range    Influenza A Ag Negative NEG      Influenza B Ag Negative NEG      Source Nasopharyngeal     COVID-19, Rapid    Specimen: Nasopharyngeal   Result Value Ref Range    Source Nasopharyngeal      SARS-CoV-2, Rapid Detected (AA) NOTD          No orders to display                         Voice dictation software was used during the making of this note. This software is not perfect and grammatical and other typographical errors may be present. This note has not been completely proofread for errors.         Isabel Balbuena, MARK ANTHONY - NP  11/28/22 0040

## 2022-11-28 NOTE — DISCHARGE INSTRUCTIONS
Use over-the-counter medications for treatment of your viral illness. For congestion, Mucinex. Afrin nose spray to help with facial congestion. Robitussin for cough. Ibuprofen for pain. Tylenol for fever. Return to the ED for new or worsening symptoms.

## 2022-11-28 NOTE — ED NOTES
I have reviewed discharge instructions with the patient. The patient verbalized understanding. Patient left ED via Discharge Method: ambulatory to Home with self    Opportunity for questions and clarification provided. Patient given 1 scripts. To continue your aftercare when you leave the hospital, you may receive an automated call from our care team to check in on how you are doing. This is a free service and part of our promise to provide the best care and service to meet your aftercare needs.  If you have questions, or wish to unsubscribe from this service please call [phone]. Thank you for Choosing our [facility] [employer] [facility] Emergency Department.       Josie Denney RN  11/28/22 3958

## 2022-11-28 NOTE — ED TRIAGE NOTES
Pt c/o of flu and cold like symptoms for 3 days/ states got worse today / is taking otc meds for fever

## 2023-06-28 ENCOUNTER — NURSE TRIAGE (OUTPATIENT)
Dept: OTHER | Facility: CLINIC | Age: 28
End: 2023-06-28

## 2023-10-02 ENCOUNTER — HOSPITAL ENCOUNTER (EMERGENCY)
Age: 28
Discharge: HOME | End: 2023-10-02
Attending: EMERGENCY MEDICINE
Payer: COMMERCIAL

## 2023-10-02 VITALS
DIASTOLIC BLOOD PRESSURE: 65 MMHG | SYSTOLIC BLOOD PRESSURE: 111 MMHG | BODY MASS INDEX: 25.77 KG/M2 | HEIGHT: 70 IN | HEART RATE: 73 BPM | WEIGHT: 180 LBS | TEMPERATURE: 98 F | RESPIRATION RATE: 16 BRPM | OXYGEN SATURATION: 96 %

## 2023-10-02 DIAGNOSIS — R19.7 DIARRHEA, UNSPECIFIED TYPE: Primary | ICD-10-CM

## 2023-10-02 LAB
ALBUMIN SERPL-MCNC: 3.2 G/DL (ref 3.5–5)
ALBUMIN/GLOB SERPL: 1.2 (ref 0.4–1.6)
ALP SERPL-CCNC: 69 U/L (ref 50–136)
ALT SERPL-CCNC: 22 U/L (ref 12–65)
ANION GAP SERPL CALC-SCNC: 5 MMOL/L (ref 2–11)
AST SERPL-CCNC: 9 U/L (ref 15–37)
BASOPHILS # BLD: 0 K/UL (ref 0–0.2)
BASOPHILS NFR BLD: 0 % (ref 0–2)
BILIRUB SERPL-MCNC: 0.6 MG/DL (ref 0.2–1.1)
BUN SERPL-MCNC: 12 MG/DL (ref 6–23)
CALCIUM SERPL-MCNC: 8.3 MG/DL (ref 8.3–10.4)
CHLORIDE SERPL-SCNC: 111 MMOL/L (ref 101–110)
CO2 SERPL-SCNC: 28 MMOL/L (ref 21–32)
CREAT SERPL-MCNC: 1.07 MG/DL (ref 0.8–1.5)
DIFFERENTIAL METHOD BLD: ABNORMAL
EOSINOPHIL # BLD: 0.1 K/UL (ref 0–0.8)
EOSINOPHIL NFR BLD: 1 % (ref 0.5–7.8)
ERYTHROCYTE [DISTWIDTH] IN BLOOD BY AUTOMATED COUNT: 12.6 % (ref 11.9–14.6)
GLOBULIN SER CALC-MCNC: 2.7 G/DL (ref 2.8–4.5)
GLUCOSE SERPL-MCNC: 117 MG/DL (ref 65–100)
HCT VFR BLD AUTO: 41.5 % (ref 41.1–50.3)
HGB BLD-MCNC: 14.8 G/DL (ref 13.6–17.2)
IMM GRANULOCYTES # BLD AUTO: 0 K/UL (ref 0–0.5)
IMM GRANULOCYTES NFR BLD AUTO: 0 % (ref 0–5)
LIPASE SERPL-CCNC: 44 U/L (ref 73–393)
LYMPHOCYTES # BLD: 1.7 K/UL (ref 0.5–4.6)
LYMPHOCYTES NFR BLD: 16 % (ref 13–44)
MAGNESIUM SERPL-MCNC: 2 MG/DL (ref 1.8–2.4)
MCH RBC QN AUTO: 32.3 PG (ref 26.1–32.9)
MCHC RBC AUTO-ENTMCNC: 35.7 G/DL (ref 31.4–35)
MCV RBC AUTO: 90.6 FL (ref 82–102)
MONOCYTES # BLD: 0.5 K/UL (ref 0.1–1.3)
MONOCYTES NFR BLD: 5 % (ref 4–12)
NEUTS SEG # BLD: 7.9 K/UL (ref 1.7–8.2)
NEUTS SEG NFR BLD: 77 % (ref 43–78)
NRBC # BLD: 0 K/UL (ref 0–0.2)
PLATELET # BLD AUTO: 195 K/UL (ref 150–450)
PMV BLD AUTO: 9.2 FL (ref 9.4–12.3)
POTASSIUM SERPL-SCNC: 3.9 MMOL/L (ref 3.5–5.1)
PROT SERPL-MCNC: 5.9 G/DL (ref 6.3–8.2)
RBC # BLD AUTO: 4.58 M/UL (ref 4.23–5.6)
SODIUM SERPL-SCNC: 144 MMOL/L (ref 133–143)
WBC # BLD AUTO: 10.3 K/UL (ref 4.3–11.1)

## 2023-10-02 PROCEDURE — 96374 THER/PROPH/DIAG INJ IV PUSH: CPT

## 2023-10-02 PROCEDURE — 2580000003 HC RX 258: Performed by: EMERGENCY MEDICINE

## 2023-10-02 PROCEDURE — 85025 COMPLETE CBC W/AUTO DIFF WBC: CPT

## 2023-10-02 PROCEDURE — 99284 EMERGENCY DEPT VISIT MOD MDM: CPT

## 2023-10-02 PROCEDURE — 80053 COMPREHEN METABOLIC PANEL: CPT

## 2023-10-02 PROCEDURE — 96361 HYDRATE IV INFUSION ADD-ON: CPT

## 2023-10-02 PROCEDURE — 83735 ASSAY OF MAGNESIUM: CPT

## 2023-10-02 PROCEDURE — 83690 ASSAY OF LIPASE: CPT

## 2023-10-02 PROCEDURE — 6360000002 HC RX W HCPCS: Performed by: EMERGENCY MEDICINE

## 2023-10-02 PROCEDURE — 6370000000 HC RX 637 (ALT 250 FOR IP): Performed by: EMERGENCY MEDICINE

## 2023-10-02 RX ORDER — ONDANSETRON 2 MG/ML
4 INJECTION INTRAMUSCULAR; INTRAVENOUS
Status: COMPLETED | OUTPATIENT
Start: 2023-10-02 | End: 2023-10-02

## 2023-10-02 RX ORDER — ONDANSETRON 4 MG/1
4 TABLET, FILM COATED ORAL EVERY 8 HOURS PRN
Qty: 12 TABLET | Refills: 0 | Status: SHIPPED | OUTPATIENT
Start: 2023-10-02 | End: 2023-10-14

## 2023-10-02 RX ORDER — HYOSCYAMINE SULFATE 0.12 MG/1
1 TABLET SUBLINGUAL
Qty: 20 EACH | Refills: 0 | Status: SHIPPED | OUTPATIENT
Start: 2023-10-02

## 2023-10-02 RX ORDER — 0.9 % SODIUM CHLORIDE 0.9 %
1000 INTRAVENOUS SOLUTION INTRAVENOUS ONCE
Status: COMPLETED | OUTPATIENT
Start: 2023-10-02 | End: 2023-10-02

## 2023-10-02 RX ADMIN — SODIUM CHLORIDE 1000 ML: 9 INJECTION, SOLUTION INTRAVENOUS at 08:01

## 2023-10-02 RX ADMIN — HYOSCYAMINE SULFATE 125 MCG: 0.12 TABLET ORAL; SUBLINGUAL at 08:01

## 2023-10-02 RX ADMIN — ONDANSETRON 4 MG: 2 INJECTION INTRAMUSCULAR; INTRAVENOUS at 08:01

## 2023-10-02 ASSESSMENT — ENCOUNTER SYMPTOMS
BACK PAIN: 0
SHORTNESS OF BREATH: 0
VOMITING: 0
DIARRHEA: 1
CONSTIPATION: 0
RHINORRHEA: 0
BLOOD IN STOOL: 0
ANAL BLEEDING: 0
NAUSEA: 0
ABDOMINAL DISTENTION: 0

## 2023-10-02 ASSESSMENT — LIFESTYLE VARIABLES
HOW OFTEN DO YOU HAVE A DRINK CONTAINING ALCOHOL: NEVER
HOW MANY STANDARD DRINKS CONTAINING ALCOHOL DO YOU HAVE ON A TYPICAL DAY: PATIENT DOES NOT DRINK

## 2023-10-02 ASSESSMENT — PAIN DESCRIPTION - LOCATION: LOCATION: ABDOMEN

## 2023-10-02 ASSESSMENT — PAIN SCALES - GENERAL: PAINLEVEL_OUTOF10: 7

## 2023-10-02 NOTE — ED PROVIDER NOTES
person, place, and time. Motor: No weakness. Procedures     Procedures    Orders Placed This Encounter   Procedures    CBC with Auto Differential    Comprehensive Metabolic Panel    Lipase    Magnesium    Continuous Pulse Oximetry    POCT Urine Dipstick        Medications given during this emergency department visit:  Medications   sodium chloride 0.9 % bolus 1,000 mL (has no administration in time range)   ondansetron (ZOFRAN) injection 4 mg (has no administration in time range)   hyoscyamine (LEVSIN/SL) sublingual tablet 125 mcg (has no administration in time range)       New Prescriptions    HYOSCYAMINE SULFATE SL (LEVSIN/SL) 0.125 MG SUBL    Place 1 tablet under the tongue every 6-8 hours as needed (abdominal cramping, diarrhea)    ONDANSETRON (ZOFRAN) 4 MG TABLET    Take 1 tablet by mouth every 8 hours as needed for Nausea or Vomiting        Past Medical History:   Diagnosis Date    Seizures (H[facility])     approx 2 years ago was last seizure        No past surgical history on file.      Social History     Socioeconomic History    Marital status: Single   Tobacco Use    Smoking status: Former    Smokeless tobacco: Never   Vaping Use    Vaping Use: Never used   Substance and Sexual Activity    Alcohol use: Not Currently    Drug use: Never        Previous Medications    CYCLOBENZAPRINE (FLEXERIL) 10 MG TABLET    Take 10 mg by mouth 3 times daily as needed    MELOXICAM (MOBIC) 7.5 MG TABLET    Take 1 tablet by mouth daily for 14 days        Results for orders placed or performed during the hospital encounter of 10/02/23   CBC with Auto Differential   Result Value Ref Range    WBC 10.3 4.3 - 11.1 K/uL    RBC 4.58 4.23 - 5.6 M/uL    Hemoglobin 14.8 13.6 - 17.2 g/dL    Hematocrit 41.5 41.1 - 50.3 %    MCV 90.6 82.0 - 102.0 FL    MCH 32.3 26.1 - 32.9 PG    MCHC 35.7 (H) 31.4 - 35.0 g/dL    RDW 12.6 11.9 - 14.6 %    Platelets 822 985 - 711 K/uL    MPV 9.2 (L) 9.4 - 12.3 FL    nRBC 0.00 0.0 - 0.2 K/uL

## 2023-10-02 NOTE — ED NOTES
Report received from [facility], assumed care at this time.      Kota Solomon RN  10/02/23 [id number]

## 2023-10-02 NOTE — DISCHARGE INSTRUCTIONS
Take medications as prescribed close follow-up primary care physician. Return to ED if symptoms worsen or progress in any way.    =====================================================    We would love to help you get a primary care doctor for follow-up after  your emergency department visit. Please call [phone] between 7AM - 6PM Monday to Friday. A care navigator will be able to assist you with setting up a doctor close to your home.

## 2023-10-08 ENCOUNTER — HOSPITAL ENCOUNTER (EMERGENCY)
Age: 28
Discharge: HOME | End: 2023-10-08
Attending: EMERGENCY MEDICINE
Payer: COMMERCIAL

## 2023-10-08 ENCOUNTER — APPOINTMENT (OUTPATIENT)
Dept: CT IMAGING | Age: 28
End: 2023-10-08
Payer: COMMERCIAL

## 2023-10-08 VITALS
OXYGEN SATURATION: 99 % | TEMPERATURE: 98.9 F | RESPIRATION RATE: 16 BRPM | HEART RATE: 88 BPM | WEIGHT: 180 LBS | SYSTOLIC BLOOD PRESSURE: 133 MMHG | BODY MASS INDEX: 25.77 KG/M2 | HEIGHT: 70 IN | DIASTOLIC BLOOD PRESSURE: 73 MMHG

## 2023-10-08 DIAGNOSIS — R11.10 ABDOMINAL PAIN, VOMITING, AND DIARRHEA: Primary | ICD-10-CM

## 2023-10-08 DIAGNOSIS — K76.0 HEPATIC STEATOSIS: ICD-10-CM

## 2023-10-08 DIAGNOSIS — R10.9 ABDOMINAL PAIN, VOMITING, AND DIARRHEA: Primary | ICD-10-CM

## 2023-10-08 DIAGNOSIS — R19.7 ABDOMINAL PAIN, VOMITING, AND DIARRHEA: Primary | ICD-10-CM

## 2023-10-08 LAB
ALBUMIN SERPL-MCNC: 3.6 G/DL (ref 3.5–5)
ALBUMIN/GLOB SERPL: 1 (ref 0.4–1.6)
ALP SERPL-CCNC: 74 U/L (ref 50–136)
ALT SERPL-CCNC: 27 U/L (ref 12–65)
ANION GAP SERPL CALC-SCNC: 3 MMOL/L (ref 2–11)
AST SERPL-CCNC: 45 U/L (ref 15–37)
BASOPHILS # BLD: 0.1 K/UL (ref 0–0.2)
BASOPHILS NFR BLD: 0 % (ref 0–2)
BILIRUB SERPL-MCNC: 1.4 MG/DL (ref 0.2–1.1)
BUN SERPL-MCNC: 13 MG/DL (ref 6–23)
CALCIUM SERPL-MCNC: 8.7 MG/DL (ref 8.3–10.4)
CHLORIDE SERPL-SCNC: 109 MMOL/L (ref 101–110)
CO2 SERPL-SCNC: 29 MMOL/L (ref 21–32)
CREAT SERPL-MCNC: 1.01 MG/DL (ref 0.8–1.5)
DIFFERENTIAL METHOD BLD: ABNORMAL
EOSINOPHIL # BLD: 0.1 K/UL (ref 0–0.8)
EOSINOPHIL NFR BLD: 0 % (ref 0.5–7.8)
ERYTHROCYTE [DISTWIDTH] IN BLOOD BY AUTOMATED COUNT: 13.2 % (ref 11.9–14.6)
GLOBULIN SER CALC-MCNC: 3.5 G/DL (ref 2.8–4.5)
GLUCOSE SERPL-MCNC: 95 MG/DL (ref 65–100)
HCT VFR BLD AUTO: 48 % (ref 41.1–50.3)
HGB BLD-MCNC: 17.2 G/DL (ref 13.6–17.2)
IMM GRANULOCYTES # BLD AUTO: 0.1 K/UL (ref 0–0.5)
IMM GRANULOCYTES NFR BLD AUTO: 1 % (ref 0–5)
LIPASE SERPL-CCNC: 25 U/L (ref 73–393)
LYMPHOCYTES # BLD: 0.6 K/UL (ref 0.5–4.6)
LYMPHOCYTES NFR BLD: 4 % (ref 13–44)
MAGNESIUM SERPL-MCNC: 2.1 MG/DL (ref 1.8–2.4)
MCH RBC QN AUTO: 32.3 PG (ref 26.1–32.9)
MCHC RBC AUTO-ENTMCNC: 35.8 G/DL (ref 31.4–35)
MCV RBC AUTO: 90.2 FL (ref 82–102)
MONOCYTES # BLD: 0.4 K/UL (ref 0.1–1.3)
MONOCYTES NFR BLD: 3 % (ref 4–12)
NEUTS SEG # BLD: 14.9 K/UL (ref 1.7–8.2)
NEUTS SEG NFR BLD: 93 % (ref 43–78)
NRBC # BLD: 0 K/UL (ref 0–0.2)
PLATELET # BLD AUTO: 222 K/UL (ref 150–450)
PMV BLD AUTO: 9.3 FL (ref 9.4–12.3)
POTASSIUM SERPL-SCNC: 5.1 MMOL/L (ref 3.5–5.1)
PROT SERPL-MCNC: 7.1 G/DL (ref 6.3–8.2)
RBC # BLD AUTO: 5.32 M/UL (ref 4.23–5.6)
SODIUM SERPL-SCNC: 141 MMOL/L (ref 133–143)
WBC # BLD AUTO: 16.1 K/UL (ref 4.3–11.1)

## 2023-10-08 PROCEDURE — 6360000004 HC RX CONTRAST MEDICATION: Performed by: EMERGENCY MEDICINE

## 2023-10-08 PROCEDURE — 99285 EMERGENCY DEPT VISIT HI MDM: CPT

## 2023-10-08 PROCEDURE — 2580000003 HC RX 258: Performed by: EMERGENCY MEDICINE

## 2023-10-08 PROCEDURE — 85025 COMPLETE CBC W/AUTO DIFF WBC: CPT

## 2023-10-08 PROCEDURE — 6370000000 HC RX 637 (ALT 250 FOR IP): Performed by: EMERGENCY MEDICINE

## 2023-10-08 PROCEDURE — 74177 CT ABD & PELVIS W/CONTRAST: CPT

## 2023-10-08 PROCEDURE — 83735 ASSAY OF MAGNESIUM: CPT

## 2023-10-08 PROCEDURE — 83690 ASSAY OF LIPASE: CPT

## 2023-10-08 PROCEDURE — 80053 COMPREHEN METABOLIC PANEL: CPT

## 2023-10-08 PROCEDURE — 96374 THER/PROPH/DIAG INJ IV PUSH: CPT

## 2023-10-08 PROCEDURE — 6360000002 HC RX W HCPCS: Performed by: EMERGENCY MEDICINE

## 2023-10-08 RX ORDER — PROMETHAZINE HYDROCHLORIDE 25 MG/1
25 TABLET ORAL EVERY 6 HOURS PRN
Qty: 20 TABLET | Refills: 2 | Status: SHIPPED | OUTPATIENT
Start: 2023-10-08 | End: 2023-10-23

## 2023-10-08 RX ORDER — ONDANSETRON 2 MG/ML
4 INJECTION INTRAMUSCULAR; INTRAVENOUS
Status: COMPLETED | OUTPATIENT
Start: 2023-10-08 | End: 2023-10-08

## 2023-10-08 RX ORDER — SODIUM CHLORIDE, SODIUM LACTATE, POTASSIUM CHLORIDE, AND CALCIUM CHLORIDE .6; .31; .03; .02 G/100ML; G/100ML; G/100ML; G/100ML
1000 INJECTION, SOLUTION INTRAVENOUS
Status: COMPLETED | OUTPATIENT
Start: 2023-10-08 | End: 2023-10-08

## 2023-10-08 RX ADMIN — SODIUM CHLORIDE, POTASSIUM CHLORIDE, SODIUM LACTATE AND CALCIUM CHLORIDE 1000 ML: 600; 310; 30; 20 INJECTION, SOLUTION INTRAVENOUS at 12:38

## 2023-10-08 RX ADMIN — ONDANSETRON 4 MG: 2 INJECTION INTRAMUSCULAR; INTRAVENOUS at 12:32

## 2023-10-08 RX ADMIN — HYOSCYAMINE SULFATE 125 MCG: 0.12 TABLET ORAL; SUBLINGUAL at 12:33

## 2023-10-08 RX ADMIN — IOPAMIDOL 100 ML: 755 INJECTION, SOLUTION INTRAVENOUS at 13:23

## 2023-10-08 ASSESSMENT — PAIN DESCRIPTION - LOCATION
LOCATION: ABDOMEN
LOCATION: ABDOMEN

## 2023-10-08 ASSESSMENT — PAIN DESCRIPTION - DESCRIPTORS
DESCRIPTORS: CRAMPING;ACHING
DESCRIPTORS: BURNING;CRAMPING

## 2023-10-08 ASSESSMENT — LIFESTYLE VARIABLES
HOW MANY STANDARD DRINKS CONTAINING ALCOHOL DO YOU HAVE ON A TYPICAL DAY: PATIENT DOES NOT DRINK
HOW OFTEN DO YOU HAVE A DRINK CONTAINING ALCOHOL: NEVER
HOW MANY STANDARD DRINKS CONTAINING ALCOHOL DO YOU HAVE ON A TYPICAL DAY: PATIENT DOES NOT DRINK

## 2023-10-08 ASSESSMENT — PAIN - FUNCTIONAL ASSESSMENT
PAIN_FUNCTIONAL_ASSESSMENT: ACTIVITIES ARE NOT PREVENTED
PAIN_FUNCTIONAL_ASSESSMENT: 0-10

## 2023-10-08 ASSESSMENT — ENCOUNTER SYMPTOMS
NAUSEA: 1
FACIAL SWELLING: 0
SHORTNESS OF BREATH: 0
DIARRHEA: 1
VOMITING: 1
BLOOD IN STOOL: 0
ABDOMINAL PAIN: 1

## 2023-10-08 ASSESSMENT — PAIN SCALES - GENERAL
PAINLEVEL_OUTOF10: 8
PAINLEVEL_OUTOF10: 8

## 2023-10-08 ASSESSMENT — PAIN DESCRIPTION - ORIENTATION: ORIENTATION: MID

## 2023-10-08 NOTE — ED PROVIDER NOTES
Emergency Department Provider Note       PCP: Not, On File (Inactive)   Age: 33 y.o. Sex: male     DISPOSITION Decision To Discharge 10/08/2023 01:47:40 PM       ICD-10-CM    1. Abdominal pain, vomiting, and diarrhea  R10.9     R11.10     R19.7       2. Hepatic steatosis  K76.0           Medical Decision Making     Complexity of Problems Addressed:  1 or more acute illnesses that pose a threat to life or bodily function. Data Reviewed and Analyzed:  I independently ordered and reviewed each unique test.         I interpreted the CT Scan splenomegaly, hepatic steatosis. Discussion of management or test interpretation. Diffuse tenderness most concentrated to epigastric region with leukocytosis and repeat emergency department visit. CT ordered. No acute surgical pathology on the CT. Mild elevation in bilirubin without suggestion of stone or cholecystitis. Multiple other family members have similar symptoms, so likely viral.  Given return precautions. No bloody stools or fever to suggest need for antibiotics. Risk of Complications and/or Morbidity of Patient Management:  Prescription drug management performed. Patient was discharged risks and benefits of hospitalization were considered. Shared medical decision making was utilized in creating the patients health plan today. History       69-year-old male presents with multiple episodes of vomiting and diarrhea since last night. He was seen in the emergency department last week for the same. Symptoms had resolved, but then his girlfriend and aunt developed similar symptoms and believes it may have come back around to him. He reports some diffuse abdominal cramping. He has been unable to eat. Denies any blood in vomit or stools. No documented fevers. No out of country travel. Review of Systems   Constitutional:  Negative for fever. HENT:  Negative for facial swelling. Eyes:  Negative for visual disturbance.

## 2023-10-08 NOTE — ED TRIAGE NOTES
Pt states that he has had abd pain with nausea and vomtiing that began on Monday. Was seen and treated in the ED on Monday.   Continues to have the pain with the vomiting

## 2023-10-22 ENCOUNTER — HOSPITAL ENCOUNTER (EMERGENCY)
Age: 28
Discharge: HOME | End: 2023-10-22
Payer: COMMERCIAL

## 2023-10-22 VITALS
RESPIRATION RATE: 16 BRPM | HEIGHT: 70 IN | OXYGEN SATURATION: 96 % | BODY MASS INDEX: 27.35 KG/M2 | SYSTOLIC BLOOD PRESSURE: 117 MMHG | DIASTOLIC BLOOD PRESSURE: 75 MMHG | HEART RATE: 100 BPM | WEIGHT: 191 LBS | TEMPERATURE: 98.2 F

## 2023-10-22 DIAGNOSIS — M79.10 MYALGIA: ICD-10-CM

## 2023-10-22 DIAGNOSIS — B34.9 VIRAL SYNDROME: Primary | ICD-10-CM

## 2023-10-22 DIAGNOSIS — J34.89 RHINORRHEA: ICD-10-CM

## 2023-10-22 LAB
FLUAV RNA SPEC QL NAA+PROBE: NOT DETECTED
FLUBV RNA SPEC QL NAA+PROBE: NOT DETECTED
SARS-COV-2 RDRP RESP QL NAA+PROBE: NOT DETECTED
SOURCE: NORMAL

## 2023-10-22 PROCEDURE — 87635 SARS-COV-2 COVID-19 AMP PRB: CPT

## 2023-10-22 PROCEDURE — 99283 EMERGENCY DEPT VISIT LOW MDM: CPT

## 2023-10-22 PROCEDURE — 87502 INFLUENZA DNA AMP PROBE: CPT

## 2023-10-22 PROCEDURE — 6370000000 HC RX 637 (ALT 250 FOR IP)

## 2023-10-22 RX ORDER — ACETAMINOPHEN 325 MG/1
650 TABLET ORAL
Status: COMPLETED | OUTPATIENT
Start: 2023-10-22 | End: 2023-10-22

## 2023-10-22 RX ORDER — FLUTICASONE PROPIONATE 50 MCG
1 SPRAY, SUSPENSION (ML) NASAL DAILY
Qty: 32 G | Refills: 1 | Status: SHIPPED | OUTPATIENT
Start: 2023-10-22

## 2023-10-22 RX ORDER — IBUPROFEN 600 MG/1
600 TABLET ORAL
Status: COMPLETED | OUTPATIENT
Start: 2023-10-22 | End: 2023-10-22

## 2023-10-22 RX ADMIN — IBUPROFEN 600 MG: 600 TABLET, FILM COATED ORAL at 18:42

## 2023-10-22 RX ADMIN — ACETAMINOPHEN 650 MG: 325 TABLET, FILM COATED ORAL at 18:42

## 2023-10-22 ASSESSMENT — LIFESTYLE VARIABLES
HOW MANY STANDARD DRINKS CONTAINING ALCOHOL DO YOU HAVE ON A TYPICAL DAY: PATIENT DOES NOT DRINK
HOW OFTEN DO YOU HAVE A DRINK CONTAINING ALCOHOL: NEVER

## 2023-10-22 ASSESSMENT — PAIN DESCRIPTION - LOCATION: LOCATION: GENERALIZED

## 2023-10-22 ASSESSMENT — PAIN - FUNCTIONAL ASSESSMENT: PAIN_FUNCTIONAL_ASSESSMENT: 0-10

## 2023-10-22 ASSESSMENT — PAIN SCALES - GENERAL: PAINLEVEL_OUTOF10: 9

## 2023-10-22 NOTE — DISCHARGE INSTRUCTIONS
Flu and COVID are negative. Please continue ibuprofen and Tylenol as needed for body aches. Please begin using Flonase nasal spray daily for runny nose and sneezing. Also begin taking a daily antihistamine such as Zyrtec, Claritin, or Allegra. Continue to rest and push fluids. Return to the ED if you begin to any high fevers, uncontrolled vomiting, or any new or worsening symptoms.

## 2023-10-22 NOTE — ED PROVIDER NOTES
Emergency Department Provider Note                   PCP:                Not, On File (Inactive)               Age: 33 y.o. Sex: male     DISPOSITION Discharge - Pending Orders Complete 10/22/2023 06:32:48 PM       ICD-10-CM    1. Viral syndrome  B34.9       2. Rhinorrhea  J34.89       3. Myalgia  M79.10           MEDICAL DECISION MAKING  Complexity of Problems Addressed:  Complexity of Problem: 1 acute, uncomplicated illness or injury. Data Reviewed and Analyzed:  Category 1:   I reviewed external records: ED visit note from an outside group. I reviewed external records: provider visit note from PCP. I reviewed external records: provider visit note from outside specialist.  I ordered each unique test.  I reviewed the results of each unique test.      Category 3: Discussion of management or test interpretation. Well-appearing 81-year-old male presents complaining of body aches, rhinorrhea, and sneezing over the past day. On presentation, patient is afebrile, vital signs are stable, and he is well-appearing in no acute distress. He has some clear rhinorrhea nasal drainage present in bilateral nares. No evidence of maxillary or frontal sinus tenderness to palpation. HEENT exam is 9 and reassuring. No nuchal rigidity or meningeal signs. Lungs clear to auscultation in all fields without crackles, wheezes, or other adventitious sounds. Rapid flu and COVID are negative. Due to stable vital signs and nontoxic appearance plan for this patient is outpatient management for most likely viral syndrome and seasonal allergies. We will begin him on Flonase nasal spray daily and instructed him to begin taking a daily antihistamine. He will continue ibuprofen and Tylenol for body aches at home. He will continue to rest and push fluids. Patient was given strict return precautions. He verbalized understanding with our plan of care and left the facility in stable condition.        Risk of Complications and/or

## 2024-07-08 ENCOUNTER — HOSPITAL ENCOUNTER (EMERGENCY)
Age: 29
Discharge: HOME | End: 2024-07-08

## 2024-07-08 VITALS
RESPIRATION RATE: 16 BRPM | OXYGEN SATURATION: 97 % | SYSTOLIC BLOOD PRESSURE: 132 MMHG | HEIGHT: 70 IN | HEART RATE: 87 BPM | BODY MASS INDEX: 29.35 KG/M2 | DIASTOLIC BLOOD PRESSURE: 95 MMHG | TEMPERATURE: 98 F | WEIGHT: 205 LBS

## 2024-07-08 DIAGNOSIS — H10.9 BACTERIAL CONJUNCTIVITIS OF LEFT EYE: Primary | ICD-10-CM

## 2024-07-08 PROCEDURE — 99283 EMERGENCY DEPT VISIT LOW MDM: CPT

## 2024-07-08 RX ORDER — ERYTHROMYCIN 5 MG/G
OINTMENT OPHTHALMIC
Qty: 3.5 G | Refills: 0 | Status: SHIPPED | OUTPATIENT
Start: 2024-07-08

## 2024-07-08 ASSESSMENT — PAIN SCALES - GENERAL: PAINLEVEL_OUTOF10: 6

## 2024-07-08 ASSESSMENT — PAIN - FUNCTIONAL ASSESSMENT: PAIN_FUNCTIONAL_ASSESSMENT: 0-10

## 2024-07-08 NOTE — ED TRIAGE NOTES
Pt c/o left eye irritation and drainage x 2 weeks, today had hard tie holding eye lid up, pt reports drainage and blurriness when waking

## 2024-07-08 NOTE — DISCHARGE INSTRUCTIONS
Treating you for a bacterial infection of your eye, also known as pinkeye.  Make sure you use the erythromycin antibiotic ointment 3 times daily in both eyes as prescribed.  As we have discussed avoid sharing any towels with anybody as this infection could be transmitted to somebody else.  Return as needed or if you have any worrisome or worsening symptoms.

## 2024-07-08 NOTE — ED PROVIDER NOTES
Emergency Department Provider Note       PCP: Not, On File (Inactive)   Age: 29 y.o.   Sex: male     DISPOSITION Decision To Discharge 07/08/2024 07:32:07 PM       ICD-10-CM    1. Bacterial conjunctivitis of left eye  H10.9           Medical Decision Making     Patient presented with concerns of approximately 2-week history of ocular erythema and purulent drainage.  On examination there is conjunctival injection and purulent drainage of the left eye.  Suspicion of bacterial conjunctivitis.  Prescribed outpatient course of erythromycin ointment, urged medication adherence.  Discussed return precautions, stable for discharge from this department.     1 acute complicated illness or injury.  Prescription drug management performed.  Patient was discharged risks and benefits of hospitalization were considered.  Shared medical decision making was utilized in creating the patients health plan today.    I independently ordered and reviewed each unique test.                     History     21-year-old male presents this department with concerns of 2-week history of left eye redness and yellowish discharge.  Reports whenever he wakes up in the morning it is difficult to pry his eyelid open.  Reports associated eye discomfort at this time.  Denies headaches or visual disturbances.    The history is provided by the patient.     Physical Exam     Vitals signs and nursing note reviewed:  Vitals:    07/08/24 1911   BP: (!) 132/95   Pulse: 87   Resp: 16   Temp: 98 °F (36.7 °C)   TempSrc: Oral   SpO2: 97%   Weight: 93 kg (205 lb)   Height: 1.778 m (5' 10\")      Physical Exam  Vitals and nursing note reviewed.   Constitutional:       General: He is not in acute distress.     Appearance: Normal appearance. He is not ill-appearing.   HENT:      Head: Normocephalic and atraumatic.   Eyes:      General:         Left eye: Discharge present.     Extraocular Movements: Extraocular movements intact.      Pupils: Pupils are equal, round,

## 2024-07-09 NOTE — ED NOTES
Patient mobility status  with no difficulty. Provider aware     I have reviewed discharge instructions with the patient.  The patient verbalized understanding.    Patient left ED via Discharge Method: ambulatory to home  with  self.  .    Opportunity for questions and clarification provided.     Patient given 1 scripts.

## 2024-08-19 ENCOUNTER — HOSPITAL ENCOUNTER (EMERGENCY)
Age: 29
Discharge: HOME | End: 2024-08-19

## 2024-08-19 ENCOUNTER — APPOINTMENT (OUTPATIENT)
Dept: GENERAL RADIOLOGY | Age: 29
End: 2024-08-19

## 2024-08-19 VITALS
OXYGEN SATURATION: 97 % | HEART RATE: 65 BPM | DIASTOLIC BLOOD PRESSURE: 84 MMHG | SYSTOLIC BLOOD PRESSURE: 124 MMHG | WEIGHT: 210 LBS | TEMPERATURE: 98 F | BODY MASS INDEX: 30.13 KG/M2 | RESPIRATION RATE: 16 BRPM

## 2024-08-19 DIAGNOSIS — R06.02 SHORTNESS OF BREATH: Primary | ICD-10-CM

## 2024-08-19 LAB
ALBUMIN SERPL-MCNC: 4.1 G/DL (ref 3.5–5)
ALBUMIN/GLOB SERPL: 1.4 (ref 1–1.9)
ALP SERPL-CCNC: 76 U/L (ref 40–129)
ALT SERPL-CCNC: 21 U/L (ref 12–65)
ANION GAP SERPL CALC-SCNC: 13 MMOL/L (ref 9–18)
AST SERPL-CCNC: 16 U/L (ref 15–37)
BASOPHILS # BLD: 0 K/UL (ref 0–0.2)
BASOPHILS NFR BLD: 0 % (ref 0–2)
BILIRUB SERPL-MCNC: 0.9 MG/DL (ref 0–1.2)
BUN SERPL-MCNC: 10 MG/DL (ref 6–23)
CALCIUM SERPL-MCNC: 9.2 MG/DL (ref 8.8–10.2)
CHLORIDE SERPL-SCNC: 104 MMOL/L (ref 98–107)
CO2 SERPL-SCNC: 23 MMOL/L (ref 20–28)
CREAT SERPL-MCNC: 0.95 MG/DL (ref 0.8–1.3)
DIFFERENTIAL METHOD BLD: ABNORMAL
EKG ATRIAL RATE: 60 BPM
EKG DIAGNOSIS: NORMAL
EKG P AXIS: 52 DEGREES
EKG P-R INTERVAL: 142 MS
EKG Q-T INTERVAL: 390 MS
EKG QRS DURATION: 90 MS
EKG QTC CALCULATION (BAZETT): 390 MS
EKG R AXIS: 25 DEGREES
EKG T AXIS: 52 DEGREES
EKG VENTRICULAR RATE: 60 BPM
EOSINOPHIL # BLD: 0.1 K/UL (ref 0–0.8)
EOSINOPHIL NFR BLD: 2 % (ref 0.5–7.8)
ERYTHROCYTE [DISTWIDTH] IN BLOOD BY AUTOMATED COUNT: 12.9 % (ref 11.9–14.6)
GLOBULIN SER CALC-MCNC: 2.9 G/DL (ref 2.3–3.5)
GLUCOSE SERPL-MCNC: 97 MG/DL (ref 70–99)
HCT VFR BLD AUTO: 41.7 % (ref 41.1–50.3)
HGB BLD-MCNC: 15.3 G/DL (ref 13.6–17.2)
IMM GRANULOCYTES # BLD AUTO: 0 K/UL (ref 0–0.5)
IMM GRANULOCYTES NFR BLD AUTO: 1 % (ref 0–5)
LYMPHOCYTES # BLD: 1.7 K/UL (ref 0.5–4.6)
LYMPHOCYTES NFR BLD: 32 % (ref 13–44)
MAGNESIUM SERPL-MCNC: 2.1 MG/DL (ref 1.8–2.4)
MCH RBC QN AUTO: 32.9 PG (ref 26.1–32.9)
MCHC RBC AUTO-ENTMCNC: 36.7 G/DL (ref 31.4–35)
MCV RBC AUTO: 89.7 FL (ref 82–102)
MONOCYTES # BLD: 0.3 K/UL (ref 0.1–1.3)
MONOCYTES NFR BLD: 5 % (ref 4–12)
NEUTS SEG # BLD: 3.3 K/UL (ref 1.7–8.2)
NEUTS SEG NFR BLD: 60 % (ref 43–78)
NRBC # BLD: 0 K/UL (ref 0–0.2)
PLATELET # BLD AUTO: 214 K/UL (ref 150–450)
PMV BLD AUTO: 9.2 FL (ref 9.4–12.3)
POTASSIUM SERPL-SCNC: 4.5 MMOL/L (ref 3.5–5.1)
PROT SERPL-MCNC: 7 G/DL (ref 6.3–8.2)
RBC # BLD AUTO: 4.65 M/UL (ref 4.23–5.6)
SODIUM SERPL-SCNC: 140 MMOL/L (ref 136–145)
WBC # BLD AUTO: 5.5 K/UL (ref 4.3–11.1)

## 2024-08-19 PROCEDURE — 93005 ELECTROCARDIOGRAM TRACING: CPT | Performed by: EMERGENCY MEDICINE

## 2024-08-19 PROCEDURE — 80053 COMPREHEN METABOLIC PANEL: CPT

## 2024-08-19 PROCEDURE — 85025 COMPLETE CBC W/AUTO DIFF WBC: CPT

## 2024-08-19 PROCEDURE — 93010 ELECTROCARDIOGRAM REPORT: CPT | Performed by: INTERNAL MEDICINE

## 2024-08-19 PROCEDURE — 83735 ASSAY OF MAGNESIUM: CPT

## 2024-08-19 PROCEDURE — 99285 EMERGENCY DEPT VISIT HI MDM: CPT

## 2024-08-19 PROCEDURE — 71046 X-RAY EXAM CHEST 2 VIEWS: CPT

## 2024-08-19 RX ORDER — ALBUTEROL SULFATE 90 UG/1
2 AEROSOL, METERED RESPIRATORY (INHALATION) 4 TIMES DAILY PRN
Qty: 18 G | Refills: 0 | Status: SHIPPED | OUTPATIENT
Start: 2024-08-19

## 2024-08-19 ASSESSMENT — ENCOUNTER SYMPTOMS
ABDOMINAL PAIN: 0
SHORTNESS OF BREATH: 1
CHEST TIGHTNESS: 1
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
